# Patient Record
Sex: MALE | Race: WHITE | NOT HISPANIC OR LATINO
[De-identification: names, ages, dates, MRNs, and addresses within clinical notes are randomized per-mention and may not be internally consistent; named-entity substitution may affect disease eponyms.]

---

## 2022-08-25 ENCOUNTER — APPOINTMENT (OUTPATIENT)
Dept: UROLOGY | Facility: CLINIC | Age: 47
End: 2022-08-25

## 2022-08-25 ENCOUNTER — TRANSCRIPTION ENCOUNTER (OUTPATIENT)
Age: 47
End: 2022-08-25

## 2022-08-25 VITALS
HEIGHT: 68 IN | SYSTOLIC BLOOD PRESSURE: 147 MMHG | WEIGHT: 175 LBS | DIASTOLIC BLOOD PRESSURE: 91 MMHG | RESPIRATION RATE: 15 BRPM | OXYGEN SATURATION: 99 % | BODY MASS INDEX: 26.52 KG/M2 | HEART RATE: 96 BPM

## 2022-08-25 DIAGNOSIS — Z78.9 OTHER SPECIFIED HEALTH STATUS: ICD-10-CM

## 2022-08-25 DIAGNOSIS — N48.6 INDURATION PENIS PLASTICA: ICD-10-CM

## 2022-08-25 DIAGNOSIS — Q85.00 NEUROFIBROMATOSIS, UNSPECIFIED: ICD-10-CM

## 2022-08-25 DIAGNOSIS — N52.9 MALE ERECTILE DYSFUNCTION, UNSPECIFIED: ICD-10-CM

## 2022-08-25 PROCEDURE — 99204 OFFICE O/P NEW MOD 45 MIN: CPT

## 2022-08-25 RX ORDER — TADALAFIL 5 MG/1
5 TABLET ORAL
Qty: 30 | Refills: 3 | Status: ACTIVE | COMMUNITY
Start: 2022-08-25 | End: 1900-01-01

## 2022-08-25 NOTE — HISTORY OF PRESENT ILLNESS
[FreeTextEntry1] : He is a 47-year-old man who is seen today for initial visit.  Recently he has been palpating hard areas at the base of the penis.  He is describing an hourglass deformity with erections.  There is no curvature.  There is no history of trauma to the penis.  He does not have any contractures in his hands.  Also since then he has noticed somewhat softer erections.  Desire is normal.  He is otherwise healthy.  He has undergone left inguinal hernia repair as a child.

## 2022-08-25 NOTE — ASSESSMENT
[FreeTextEntry1] : The left testis is smaller most likely as result of hernia repair in the past.  He has large hard plaques at the base of the penis.  Pathophysiology of Peyronie's disease was discussed.  He does not have any penile curvature.  He has a slight discomfort with erections.  Options such as continued observation, surgical excision or Xiaflex injections, if he is a candidate, were discussed.  He may see one of my colleagues regarding this issue.\par We discussed the underlying mechanism for erections, pathophysiology of erectile dysfunction (ED) and treatment options. Role of smoking, diabetes, hypertension, hyperlipidemia, coronary artery disease and treatment for benign and malignant prostate conditions was discussed as some of the more common causes of ED. Exercise, weight loss and a healthy lifestyle can be beneficial. We discussed testosterone (T) and its possible link to increased desire for sexual activity, feeling energetic, muscle mass, etc. Low T as a cause for ED is less clear. Mechanism by which PDE-5 inhibitors improve erections was discussed. Medications in this category include Viagra, Levitra, Staxyn, Cialis and Stendra. As needed versus daily dosing was discussed.  He can try tadalafil 5 to 10 mg to see if symptoms improve.  PSA level and testosterone level will be sent.

## 2022-08-25 NOTE — PHYSICAL EXAM
[General Appearance - Well Developed] : well developed [General Appearance - Well Nourished] : well nourished [Normal Appearance] : normal appearance [Well Groomed] : well groomed [General Appearance - In No Acute Distress] : no acute distress [Edema] : no peripheral edema [Respiration, Rhythm And Depth] : normal respiratory rhythm and effort [Exaggerated Use Of Accessory Muscles For Inspiration] : no accessory muscle use [Abdomen Soft] : soft [Abdomen Tenderness] : non-tender [Costovertebral Angle Tenderness] : no ~M costovertebral angle tenderness [Urethral Meatus] : meatus normal [Urinary Bladder Findings] : the bladder was normal on palpation [Scrotum] : the scrotum was normal [Testes Mass (___cm)] : there were no testicular masses [FreeTextEntry1] : Small left testis, large hard plaques at the base of the penis bilaterally [Normal Station and Gait] : the gait and station were normal for the patient's age [] : no rash [No Focal Deficits] : no focal deficits [Oriented To Time, Place, And Person] : oriented to person, place, and time [Affect] : the affect was normal [Mood] : the mood was normal [Not Anxious] : not anxious [Inguinal Lymph Nodes Enlarged Bilaterally] : inguinal

## 2022-08-26 ENCOUNTER — NON-APPOINTMENT (OUTPATIENT)
Age: 47
End: 2022-08-26

## 2022-08-26 LAB
PSA SERPL-MCNC: 0.75 NG/ML
TESTOST SERPL-MCNC: 548 NG/DL

## 2024-05-27 ENCOUNTER — APPOINTMENT (OUTPATIENT)
Dept: ORTHOPEDIC SURGERY | Facility: CLINIC | Age: 49
End: 2024-05-27
Payer: COMMERCIAL

## 2024-05-27 DIAGNOSIS — S63.636A SPRAIN OF INTERPHALANGEAL JOINT OF RIGHT LITTLE FINGER, INITIAL ENCOUNTER: ICD-10-CM

## 2024-05-27 DIAGNOSIS — Z00.00 ENCOUNTER FOR GENERAL ADULT MEDICAL EXAMINATION W/OUT ABNORMAL FINDINGS: ICD-10-CM

## 2024-05-27 PROCEDURE — 99203 OFFICE O/P NEW LOW 30 MIN: CPT

## 2024-05-27 PROCEDURE — 73130 X-RAY EXAM OF HAND: CPT | Mod: RT

## 2024-05-27 NOTE — HISTORY OF PRESENT ILLNESS
[0] : 0 [de-identified] : STEPHEN HAMPTON a 48 year old male here for evaluation of his right pinky. Patient states he was playing catch with his daughter and thinks he may have caught the ball wrong. Sinc e than he has been having pain and swelling in the finger. States he has it wrong with a splint.  [FreeTextEntry1] : right hand  [] : no

## 2024-05-27 NOTE — PHYSICAL EXAM
[Right] : right hand [5th] : 5th [Distal Phalanx] : distal phalanx [DIP Joint] : DIP joint [Middle Phalanx] : middle phalanx [PIP Joint] : PIP joint [Proximal Phalanx] : proximal phalanx [5th Finger] : 5th finger [] : good capillary refill in all fingers [FreeTextEntry9] : decreased ROM 5th MCP/PIP/DIP

## 2024-05-27 NOTE — ASSESSMENT
[FreeTextEntry1] : We reviewed the findings and the history. Questions were answered and concerns addressed. The options were outlined. Will d/c the splint. Stretching and NSAIDs. Icing discussed.  Progress note completed by Suly SHEN. Patient seen by Suly SHEN.

## 2024-05-27 NOTE — REASON FOR VISIT
[FreeTextEntry2] : This is a 48 year old RHD M with right 5th finger pain since last weekend after playing catch with his daughter.  He doesn't recall the exact mechanism.  He splinted it himself.  No ice or meds or numbness.